# Patient Record
Sex: FEMALE | Race: BLACK OR AFRICAN AMERICAN | Employment: OTHER | URBAN - METROPOLITAN AREA
[De-identification: names, ages, dates, MRNs, and addresses within clinical notes are randomized per-mention and may not be internally consistent; named-entity substitution may affect disease eponyms.]

---

## 2022-03-18 PROBLEM — E55.9 VITAMIN D DEFICIENCY: Status: ACTIVE | Noted: 2021-09-30

## 2022-03-19 PROBLEM — U07.1 COVID-19: Status: ACTIVE | Noted: 2022-01-10

## 2022-07-26 ENCOUNTER — HOSPITAL ENCOUNTER (INPATIENT)
Age: 32
LOS: 1 days | Discharge: HOME | End: 2022-07-27
Attending: STUDENT IN AN ORGANIZED HEALTH CARE EDUCATION/TRAINING PROGRAM | Admitting: STUDENT IN AN ORGANIZED HEALTH CARE EDUCATION/TRAINING PROGRAM
Payer: COMMERCIAL

## 2022-07-26 LAB
ABO + RH BLD: NORMAL
BLOOD GROUP ANTIBODIES SERPL: NORMAL
ERYTHROCYTE [DISTWIDTH] IN BLOOD BY AUTOMATED COUNT: 15.4 % (ref 11.9–14.6)
HCT VFR BLD AUTO: 38.8 % (ref 35.8–46.3)
HGB BLD-MCNC: 12.5 G/DL (ref 11.7–15.4)
MCH RBC QN AUTO: 29.3 PG (ref 26.1–32.9)
MCHC RBC AUTO-ENTMCNC: 32.2 G/DL (ref 31.4–35)
MCV RBC AUTO: 90.9 FL (ref 79.6–97.8)
NRBC # BLD: 0 K/UL (ref 0–0.2)
PLATELET # BLD AUTO: 152 K/UL (ref 150–450)
PMV BLD AUTO: 11.4 FL (ref 9.4–12.3)
RBC # BLD AUTO: 4.27 M/UL (ref 4.05–5.2)
SPECIMEN EXP DATE BLD: NORMAL
WBC # BLD AUTO: 9.5 K/UL (ref 4.3–11.1)

## 2022-07-26 PROCEDURE — 7210000100 HC LABOR FEE PER 1 HR

## 2022-07-26 PROCEDURE — 4A1HXCZ MONITORING OF PRODUCTS OF CONCEPTION, CARDIAC RATE, EXTERNAL APPROACH: ICD-10-PCS | Performed by: STUDENT IN AN ORGANIZED HEALTH CARE EDUCATION/TRAINING PROGRAM

## 2022-07-26 PROCEDURE — 7100000011 HC PHASE II RECOVERY - ADDTL 15 MIN

## 2022-07-26 PROCEDURE — 6370000000 HC RX 637 (ALT 250 FOR IP): Performed by: STUDENT IN AN ORGANIZED HEALTH CARE EDUCATION/TRAINING PROGRAM

## 2022-07-26 PROCEDURE — 85027 COMPLETE CBC AUTOMATED: CPT

## 2022-07-26 PROCEDURE — 36415 COLL VENOUS BLD VENIPUNCTURE: CPT

## 2022-07-26 PROCEDURE — 7100000010 HC PHASE II RECOVERY - FIRST 15 MIN

## 2022-07-26 PROCEDURE — 86901 BLOOD TYPING SEROLOGIC RH(D): CPT

## 2022-07-26 PROCEDURE — 99282 EMERGENCY DEPT VISIT SF MDM: CPT

## 2022-07-26 PROCEDURE — 7220000101 HC DELIVERY VAGINAL/SINGLE

## 2022-07-26 PROCEDURE — 1100000000 HC RM PRIVATE

## 2022-07-26 RX ORDER — OXYCODONE HYDROCHLORIDE 5 MG/1
5 TABLET ORAL EVERY 4 HOURS PRN
Status: DISCONTINUED | OUTPATIENT
Start: 2022-07-26 | End: 2022-07-27 | Stop reason: HOSPADM

## 2022-07-26 RX ORDER — HYDROCORTISONE ACETATE PRAMOXINE HCL 2.5; 1 G/100G; G/100G
CREAM TOPICAL
Status: DISCONTINUED | OUTPATIENT
Start: 2022-07-26 | End: 2022-07-27 | Stop reason: HOSPADM

## 2022-07-26 RX ORDER — ONDANSETRON 8 MG/1
8 TABLET, ORALLY DISINTEGRATING ORAL EVERY 8 HOURS PRN
Status: DISCONTINUED | OUTPATIENT
Start: 2022-07-26 | End: 2022-07-27 | Stop reason: HOSPADM

## 2022-07-26 RX ORDER — SODIUM CHLORIDE 0.9 % (FLUSH) 0.9 %
5-40 SYRINGE (ML) INJECTION EVERY 12 HOURS SCHEDULED
Status: DISCONTINUED | OUTPATIENT
Start: 2022-07-26 | End: 2022-07-27 | Stop reason: HOSPADM

## 2022-07-26 RX ORDER — ACETAMINOPHEN 500 MG
1000 TABLET ORAL EVERY 8 HOURS
Status: DISCONTINUED | OUTPATIENT
Start: 2022-07-26 | End: 2022-07-27 | Stop reason: HOSPADM

## 2022-07-26 RX ORDER — SODIUM CHLORIDE, SODIUM LACTATE, POTASSIUM CHLORIDE, CALCIUM CHLORIDE 600; 310; 30; 20 MG/100ML; MG/100ML; MG/100ML; MG/100ML
INJECTION, SOLUTION INTRAVENOUS CONTINUOUS
Status: DISCONTINUED | OUTPATIENT
Start: 2022-07-26 | End: 2022-07-27 | Stop reason: HOSPADM

## 2022-07-26 RX ORDER — SODIUM CHLORIDE 9 MG/ML
INJECTION, SOLUTION INTRAVENOUS PRN
Status: DISCONTINUED | OUTPATIENT
Start: 2022-07-26 | End: 2022-07-27 | Stop reason: HOSPADM

## 2022-07-26 RX ORDER — IBUPROFEN 800 MG/1
800 TABLET ORAL EVERY 8 HOURS
Status: DISCONTINUED | OUTPATIENT
Start: 2022-07-26 | End: 2022-07-27 | Stop reason: HOSPADM

## 2022-07-26 RX ORDER — SODIUM CHLORIDE 0.9 % (FLUSH) 0.9 %
5-40 SYRINGE (ML) INJECTION PRN
Status: DISCONTINUED | OUTPATIENT
Start: 2022-07-26 | End: 2022-07-27 | Stop reason: HOSPADM

## 2022-07-26 RX ORDER — MAGNESIUM CARB/ALUMINUM HYDROX 105-160MG
120 TABLET,CHEWABLE ORAL DAILY PRN
Status: DISCONTINUED | OUTPATIENT
Start: 2022-07-26 | End: 2022-07-26

## 2022-07-26 RX ORDER — POLYETHYLENE GLYCOL 3350 17 G/17G
17 POWDER, FOR SOLUTION ORAL DAILY
Status: DISCONTINUED | OUTPATIENT
Start: 2022-07-26 | End: 2022-07-27 | Stop reason: HOSPADM

## 2022-07-26 RX ORDER — IBUPROFEN 800 MG/1
800 TABLET ORAL EVERY 8 HOURS
Status: DISCONTINUED | OUTPATIENT
Start: 2022-07-26 | End: 2022-07-26

## 2022-07-26 RX ORDER — LANOLIN
CREAM (ML) TOPICAL PRN
Status: DISCONTINUED | OUTPATIENT
Start: 2022-07-26 | End: 2022-07-27 | Stop reason: HOSPADM

## 2022-07-26 RX ORDER — ACETAMINOPHEN 500 MG
1000 TABLET ORAL EVERY 8 HOURS
Status: DISCONTINUED | OUTPATIENT
Start: 2022-07-26 | End: 2022-07-26

## 2022-07-26 RX ADMIN — ACETAMINOPHEN 1000 MG: 500 TABLET, FILM COATED ORAL at 23:03

## 2022-07-26 RX ADMIN — IBUPROFEN 800 MG: 800 TABLET, FILM COATED ORAL at 11:16

## 2022-07-26 RX ADMIN — OXYCODONE 5 MG: 5 TABLET ORAL at 08:12

## 2022-07-26 RX ADMIN — WITCH HAZEL 40 EACH: 500 SOLUTION RECTAL; TOPICAL at 23:03

## 2022-07-26 RX ADMIN — Medication 166.7 ML: at 07:40

## 2022-07-26 ASSESSMENT — PAIN DESCRIPTION - LOCATION
LOCATION: ABDOMEN;RECTUM
LOCATION: PERINEUM

## 2022-07-26 ASSESSMENT — PAIN DESCRIPTION - DESCRIPTORS: DESCRIPTORS: ACHING;CRAMPING;PRESSURE

## 2022-07-26 ASSESSMENT — PAIN SCALES - GENERAL
PAINLEVEL_OUTOF10: 10
PAINLEVEL_OUTOF10: 7

## 2022-07-26 NOTE — PROGRESS NOTES
Admit to [facility] for C/o UC starting at 5 AM.  Now UC are 3 min apart and pt describes feeling the urge to have a BM. -130 likely variable deceleration.   Pt is requesting and epidural.

## 2022-07-26 NOTE — LACTATION NOTE

## 2022-07-26 NOTE — PROGRESS NOTES
07/26/22 0717   Cervical Exam   Dilation (cm) 10   Effacement 100   Station 0   Station (Labor Curve Graph) 5   OB Examiner KIANA RN   Membrane/Amniotic Fluid   Sac Identifier Sac 1   Rupture Date 07/25/22   Rupture Time 0500   Amniotic Fluid Color Clear   Amniotic Fluid Odor None   Amniotic Fluid Amount Scant     Called to L and D for stat room and to call Dr Kaye Bains with complete pt in labor.

## 2022-07-26 NOTE — L&D DELIVERY NOTE
Mother's Information      Labor Events     Labor?: No  Cervical Ripening:   Now               Tonie Koyanagi, Baby Girl Sania Public [[id number]]      Labor Events     Labor?: No   Steroids?: None  Cervical Ripening Date/Time:     Antibiotics Received during Labor?: No  Rupture Date/Time: 22 05:00:00   Fluid Color: Clear  Fluid Odor: None  Induction: None  Augmentation: None  Labor Complications: None       Anesthesia    Method: None       Start Pushing      Labor onset date/time: 22 05:00:00 Now     Dilation complete date/time: 22 07:17:00 Now     Start pushing date/time: 2022 07:30:00   Decision date/time (emergent ):           Delivery ()      Delivery Date/Time:  22 07:35:00   Delivery Type: Vaginal, Spontaneous    Details:            Shoulder Dystocia    Shoulder Dystocia Present?: No  Add Second Maneuver  Add Third Maneuver  Add Fourth Maneuver  Add Fifth Maneuver  Add Sixth Maneuver  Add Seventh Maneuver  Add Eighth Maneuver  Add Ninth Maneuver       Assisted Delivery Details    Forceps Attempted?: No  Vacuum Extractor Attempted?: No       Document Additional Attempt         Document Additional Attempt                 Cord    Vessels: 3 Vessels  Complications: None  Delayed Cord Clamping?: Yes  Cord Clamped Date/Time: 2022 07:36:00  Cord Blood Disposition: Lab  Gases Sent?: No       Placenta    Date/Time: 2022 07:38:00       Lacerations           Vaginal Counts    Initial Count Personnel: Fermin Spoon  Initial Count Verified By: Shaista Sheffield RN    Sponges Needles Instruments   Initial Counts Correct Correct Correct   Final Counts Correct Correct Correct   Final Count Personnel: Fermin Spoon  Final Count Verified By: Shaista Sheffield RN  Accurate Final Count?: Yes  If the count is incorrect due to Intentionally Retained Foreign Object (IRFO) add the IRFO LDA in Lines/Drains.   Add LDA: Link to [facility]       Blood Loss  Mother: Sanjuana Feliciano" #[id number] Start of Mother's Information      Delivery Blood Loss  22 0500 - 22 0759      None                 End of Mother's Information  Mother: Kevin Dixon" #[id number]                Delivery Providers    Delivering clinician: Walker Mansfield MD     Provider Role    Walker Mansfield MD [address] [place] [address], RN Primary Nurse    Romulo Ca, RN Primary  Nurse    Marc Dhaliwal RN Charge Nurse    [address] Reza, RN Staff Nurse              [place] Assessment    Living Status: Living  Delivery Location Comment: 433     Apgar Scoring Key:    0 1 2    Skin Color: Blue or pale Acrocyanotic Completely pink    Heart Rate: Absent <100 bpm >100 bpm    Reflex Irritability: No response Grimace Cry or active withdrawal    Muscle Tone: Limp Some flexion Active motion    Respiratory Effort: Absent Weak cry; hypoventilation Good, crying                      Skin Color:   Heart Rate:   Reflex Irritability:   Muscle Tone:   Respiratory Effort:    Total:            1 Minute:        Apgar 1 total from OB History    5 Minute:        Apgar 5 total from OB History    10 Minute:              15 Minute:              20 Minute:                        Apgars Assigned By: Thang Lewis RN              Resuscitation    Method: Bulb Suction, Stimulation              Measurements      Birth Weight: 2400 g Birth Length: 0.48 m     Head Circumference: 0.32 m Chest Circumference: 0.3 m              Title      Skin to Skin Initiation Date/Time: 22 07:57:24 EDT     Skin to Skin With: Mother     Skin to Skin End Date/Time:

## 2022-07-26 NOTE — LACTATION NOTE
This note was copied from a baby's chart. Lactation visit. 2nd time mom, precip delivery this morning. Mom plans to bottle feed and pump only. Baby has taken 2 bottles so far, doing well with bottle feeds. Assisted Dad to bottle feed now. Upright hold and paced technique reviewed. Baby took bottle well, 20ml total. Assisted mom to pump. Reviewed fit of flange, pump parts, settings and hands on pumping technique. Mom pumped 5ml colostrum. Will save and feed at next feeding. Reviewed supply and demand. Pump every 3 hours or each time baby has a bottle. Ideal to pump 8 times in 24 hours. Mom agreeable. Reviewed safe breastmilk storage rules. Questions answered.

## 2022-07-26 NOTE — OP NOTE
Delivery Note    Obstetrician:  Joleen Canavan, MD    Assistant: none    Pre-Delivery Diagnosis: Term pregnancy, Spontaneous labor, Single fetus, and Pregnancy complicated by: velamentous cord insertion, anterior accessory lobe of placenta    Post-Delivery Diagnosis: Living  infant(s) and Female    Intrapartum Event: None    Procedure: Spontaneous vaginal delivery    Epidural: none    Monitor:  Fetal Heart Tones - External and Uterine Contractions - External    Indications for instrumental delivery: none    Estimated Blood Loss: see QBL    Episiotomy: n/a    Laceration(s):  none    Laceration(s) repair: n/a    Presentation: Cephalic    Fetal Description: rogers    Fetal Position: Left Occiput Anterior    Birth Weight: 5 lb 2 oz    Apgar - One Minute: 8    Apgar - Five Minutes: 9    Umbilical Cord: 3 vessels present and Cord blood sent to lab for type, Rh, and Susannah' test  Specimens: none  Complications:  none           Cord Blood Results:   Information for the patient's :  Vicki Spina Girl Kelly Face [[id number]]   No results found for: PCTABR, PCTDIG, BILI   Prenatal Labs:   No results found for: PCTABR     Attending Attestation: I was present and scrubbed for the entire procedure.     MD Geno Maldonado

## 2022-07-27 VITALS
HEART RATE: 74 BPM | OXYGEN SATURATION: 97 % | SYSTOLIC BLOOD PRESSURE: 123 MMHG | TEMPERATURE: 97.9 F | DIASTOLIC BLOOD PRESSURE: 86 MMHG | RESPIRATION RATE: 18 BRPM

## 2022-07-27 LAB — HGB BLD-MCNC: 10.8 G/DL (ref 11.7–15.4)

## 2022-07-27 PROCEDURE — 36415 COLL VENOUS BLD VENIPUNCTURE: CPT

## 2022-07-27 PROCEDURE — 6370000000 HC RX 637 (ALT 250 FOR IP): Performed by: STUDENT IN AN ORGANIZED HEALTH CARE EDUCATION/TRAINING PROGRAM

## 2022-07-27 PROCEDURE — 85018 HEMOGLOBIN: CPT

## 2022-07-27 RX ORDER — OXYCODONE HYDROCHLORIDE 5 MG/1
5 TABLET ORAL EVERY 4 HOURS PRN
Qty: 20 TABLET | Refills: 0 | Status: SHIPPED | OUTPATIENT
Start: 2022-07-27 | End: 2022-07-30

## 2022-07-27 RX ORDER — IBUPROFEN 800 MG/1
800 TABLET ORAL EVERY 8 HOURS
Qty: 120 TABLET | Refills: 3 | Status: SHIPPED | OUTPATIENT
Start: 2022-07-27

## 2022-07-27 RX ADMIN — IBUPROFEN 800 MG: 800 TABLET, FILM COATED ORAL at 08:02

## 2022-07-27 NOTE — CARE COORDINATION
Chart reviewed - first time parent. SW met with patient to complete initial assessment.  provided education on [facility] Postpartum [place] Home Visit Program.  Family was undecided on need for home visit. No referral will be made at this time. Family has this 's contact information should they decide to participate in program.    Patient given informational packet on  mood & anxiety disorders (resources/education). Family denies any additional needs from  at this time. Family has 's contact information should any needs/questions arise.     DIONE Palma-ANEL, [address] [facility]   [phone]

## 2022-07-27 NOTE — LACTATION NOTE
This note was copied from a baby's chart. Individualized Feeding Plan for Breastfeeding   Lactation Services [phone]    As much as possible, hold your baby on your chest so babys bare skin is against your bare skin with a blanket covering babys back, especially 30 minutes before it is time for baby to eat. Watch for early feeding cues such as, licking lips, sucking motions, rooting, hands to mouth. Crying is a late feeding cue. Feed your baby at least 8 times in 24 hours, or more if your baby is showing feeding cues. If baby is sleepy put baby skin to skin and watch for hunger cues. To rouse baby: unwrap, undress, massage hands, feet, & back, change diaper, gently change babys position from lying to sitting. 15-20 minutes on the first breast of active breastfeeding is considered a good feeding. Good, active breastfeeding is when baby is alert, tugging the nipple, their ear may move, and you can hear swallows. Allow baby to finish the first side before changing sides. Sleeping at the breast or only brief, light sucks should not be considered a good, full breastfeed. At each feeding:  __x__1. Do Suck Practice on finger before each feeding until sucking pattern is smooth. Try using index finger. Nail down towards tongue. __x__2. Hand Express for a few minutes prior to latching to help start milk flow. __x__3. Baby needs to NURSE WELL x 15-20 minutes on at least first breast, burp and offer 2nd breast at every feeding. If no sustained latch only attempt at breast for 10 minutes. If baby does not latch on and feed well on at least one side, you should:   __x__4. Double pump for 15 minutes with breast massage and compression. Hand express for an additional 2-3 minutes per side. Pump after each feeding attempt or poor feeding, up to 8 times per day. If you are not putting baby to the breast you need to pump 8 times a day. Pump every 3 hours. __x__5.  Give baby all of the breast milk you obtain using a straight syringe or  curved syringe. If baby does NOT have enough wet and dirty diapers per day, is jaundiced/lethargic, or has significant weight loss AND you do NOT pump enough milk for each feeding (per volume listed below), formula supplementation may need to be used. Call lactation department /pediatrician if you have concerns. AVERAGE INTAKES OF COLOSTRUM BY HEALTHY  INFANTS:  Time  Day Intake (ml per feeding)  Based on 8 feedings per day. 1st 24 hrs  1 2-10 ml  24-48 hrs  2 5-15 ml  48-72 hrs  3 15-30 ml (0.5-1 oz)  72-96 hrs  4 30-45 ml (1-1.5oz)                          5-6      45-60 ml (1.5-2oz)                           7          60 ml + more as desired    By day 7, baby will need 60 ml or 1.8 oz at each feeding based on 8 feedings per day & babys weight. (1oz = 30ml). Total milk volume needed in 24 hours by Day 7 is 15 oz per day based on baby's birthweight of 5lb 5oz. The more often baby eats, the less volume they need per feeding. If baby is eating more often than the minimum of 8 times per day, they may take less per feeding. Comments: If pumping, suggest using olive oil or coconut oil on your nipples before pumping to help reduce the friction. Use feeding plan until follow up with pediatrician. Continue to attempt at the breast for most feeds. Pump every 3 hours if no latch. Give all pumped colostrum/breastmilk at each feeding. OUTPATIENT APPOINTMENT Suggested. Outpatient services are located on the 4th floor at [facility]. Check in at the 4th floor registration desk (the same one you used when you came to have your baby).   Call for questions [phone]

## 2022-07-27 NOTE — DISCHARGE INSTRUCTIONS
Pelvic rest for 6wk  NO driving for 2wk or while taking pain medications  NO tub baths, pools, or hot tubs for 6wk. NO push/pull motion such as vacuuming or sweeping for 2wk  NO lifting >10lb for 2wk. Vaginal Childbirth: Care Instructions  Overview     Vaginal birth means delivering a baby through the birth canal (vagina). During labor, the uterus tightens (contracts) regularly to thin and open the cervixand to push the baby out through the birth canal.  Your body will slowly heal in the next few weeks. It's easy to get too tired and overwhelmed during the first weeks after your baby is born. Changes in your hormones can shift your mood without warning. You may find it hard to meet theextra demands on your energy and time. Take it easy on yourself. Follow-up care is a key part of your treatment and safety. Be sure to make and go to all appointments, and call your doctor if you are having problems. It's also a good idea to know your test results and keep alist of the medicines you take. How can you care for yourself at home? Vaginal bleeding and cramps  After delivery, you will have a bloody discharge from your vagina. This will turn pink within a week and then white or yellow after about 10 days. It may last for 2 to 4 weeks or longer, until the uterus has healed. Use sanitary pads until you stop bleeding. Using pads makes it easier to monitor your bleeding. Don't worry if you pass some blood clots, as long as they are smaller than a golf ball. If you have a tear or stitches in your vaginal area, change the pad at least every 4 hours. This will help prevent soreness and infection. You may have cramps for the first few days after childbirth. These are normal and occur as the uterus shrinks to normal size. Take an over-the-counter pain medicine, such as acetaminophen (Tylenol), ibuprofen (Advil, Motrin), or naproxen (Aleve), for cramps. Read and follow all instructions on the label.  Do not take aspirin, because it can cause more bleeding. Do not take two or more pain medicines at the same time unless the doctor told you to. Many pain medicines have acetaminophen, which is Tylenol. Too much acetaminophen (Tylenol) can be harmful. Stitches  If you have stitches, they will dissolve on their own and don't need to be removed. Follow your doctor's instructions for cleaning the stitched area. Put ice or a cold pack on your painful area for 10 to 20 minutes at a time, several times a day, for the first few days. Put a thin cloth between the ice and your skin. Sit in a few inches of warm water (sitz bath) 3 times a day and after bowel movements. The warm water helps with pain and itching. If you don't have a tub, a warm shower might help. Breast fullness  Your breasts may overfill (engorge) in the first few days after nursing, don't put warmth on your breasts or touch your breasts. Wear a bra that fits well and use ice until the fullness goes away. This usually takes 2 to 3 days. Put ice or a cold pack on your breast after nursing to reduce swelling and pain. Put a thin cloth between the ice and your skin. Activity  Eat a balanced diet. Don't try to lose weight by cutting calories. Keep taking your prenatal vitamins, or take a multivitamin. Get as much rest as you can. Try to take naps when your baby sleeps during the day. Get some exercise every day. But don't do any heavy exercise until your doctor says it is okay. Wait until you are healed (about 4 to 6 weeks) before you have sexual intercourse. Your doctor will tell you when it is okay to have sex. If you don't want to get pregnant, talk to your doctor about birth control. You can get pregnant even before your period returns. Also, you can get pregnant while you are breastfeeding. Mental health  It's normal to have some sadness, anxiety, sleeplessness, and mood swings after you go home.  If you feel upset or hopeless for more than a few days or are having trouble doing the things you need to do, talk to your doctor. Constipation and hemorrhoids  Drink plenty of fluids. If you have kidney, heart, or liver disease and have to limit fluids, talk with your doctor before you increase the amount of fluids you drink. Eat plenty of fiber each day. Have a bran muffin or bran cereal for breakfast. Try eating a piece of fruit for a mid-afternoon snack. For painful, itchy hemorrhoids, put ice or a cold pack on the area several times a day for 10 minutes at a time. Follow this by putting a warm compress on the area for another 10 to 20 minutes or by sitting in a shallow, warm bath. When should you call for help? Share this information with your partner, family, or a friend. They can help you watch for warning signs. Call 911 anytime you think you may need emergency care. For example, call if:    You have thoughts of harming yourself, your baby, or another person. You passed out (lost consciousness). You have chest pain, are short of breath, or cough up blood. You have a seizure. Call your doctor now or seek immediate medical care if:    You have signs of hemorrhage (too much bleeding), such as:  Heavy vaginal bleeding. This means that you are soaking through one or more pads in an hour. Or you pass blood clots bigger than an egg. Feeling dizzy or lightheaded, or you feel like you may faint. Feeling so tired or weak that you cannot do your usual activities. A fast or irregular heartbeat. New or worse belly pain. You have signs of infection, such as:  A fever. Vaginal discharge that smells bad. New or worse belly pain. You have symptoms of a blood clot in your leg (called a deep vein thrombosis), such as:  Pain in the calf, back of the knee, thigh, or groin. Redness and swelling in your leg or groin. You have signs of preeclampsia, such as:  Sudden swelling of your face, hands, or feet.   New vision problems (such as dimness, blurring, or seeing spots). A severe headache. Watch closely for changes in your health, and be sure to contact your doctor if:    Your vaginal bleeding isn't decreasing. You feel sad, anxious, or hopeless for more than a few days. You are having problems with your breasts or breastfeeding. Where can you learn more? Go to https://chpeanabeleweb.healthAuditFile. org and sign in to your [employer] account. Enter V436 in the Xueersi box to learn more about \"Vaginal Childbirth: Care Instructions. \"     If you do not have an account, please click on the \"Sign Up Now\" link. Current as of: February 23, 2022               Content Version: 13.3  © 2006-2022 [employer]. Care instructions adapted under license by [facility] ([facility]). If you have questions about a medical condition or this instruction, always ask your healthcare professional. [address] any warranty or liability for your use of this information.

## 2022-07-27 NOTE — LACTATION NOTE
This note was copied from a baby's chart. In to see mom and infant for discharge. Mom plans to pump and formula feed. Gave feeding plan for guidance on amounts to aim for in first week of life w/ milk volume. Reviewed how to manage period of engorgement. Mom has personal pump at home. No further needs at this time.

## 2022-07-27 NOTE — PROGRESS NOTES
Shift assessment complete as noted. Patient request pain medicine. Questions encouraged and answered. Encouraged to call for needs or concerns. Verbalizes understanding. Tucks given with instructions. Plan of care reviewed and white board updated.

## 2022-10-05 ENCOUNTER — TELEPHONE (OUTPATIENT)
Dept: PRIMARY CARE CLINIC | Facility: CLINIC | Age: 32
End: 2022-10-05

## 2022-10-05 DIAGNOSIS — E55.9 VITAMIN D DEFICIENCY, UNSPECIFIED: ICD-10-CM

## 2022-10-06 RX ORDER — ERGOCALCIFEROL 1.25 MG/1
CAPSULE ORAL
Qty: 4 CAPSULE | Refills: 5 | OUTPATIENT
Start: 2022-10-06

## 2022-10-12 NOTE — TELEPHONE ENCOUNTER
Called and left patient a message to call the office and schedule an appointment for medication refills.

## 2022-11-18 ENCOUNTER — TELEPHONE (OUTPATIENT)
Dept: PRIMARY CARE CLINIC | Facility: CLINIC | Age: 32
End: 2022-11-18

## 2022-11-18 NOTE — TELEPHONE ENCOUNTER
Called and left a message for patient to call the office and schedule an appointment .      Appointment Request From: Hair Catalan     With Provider: Abdullahi Santana MD [[facility]]     Preferred Date Range: 11/21/2022 - 11/25/2022     Preferred Times: Any Time     Reason for visit: Office Visit     Comments:  Vaccine titers-MMR Hep B   Vitamin D refill

## 2023-02-27 ENCOUNTER — TELEMEDICINE (OUTPATIENT)
Dept: PRIMARY CARE CLINIC | Facility: CLINIC | Age: 33
End: 2023-02-27
Payer: MEDICAID

## 2023-02-27 DIAGNOSIS — Z87.898 HISTORY OF MOTION SICKNESS: ICD-10-CM

## 2023-02-27 DIAGNOSIS — E55.9 VITAMIN D DEFICIENCY: Primary | ICD-10-CM

## 2023-02-27 DIAGNOSIS — Z79.899 ENCOUNTER FOR LONG-TERM (CURRENT) USE OF MEDICATIONS: ICD-10-CM

## 2023-02-27 PROCEDURE — 99213 OFFICE O/P EST LOW 20 MIN: CPT | Performed by: FAMILY MEDICINE

## 2023-02-27 RX ORDER — SCOLOPAMINE TRANSDERMAL SYSTEM 1 MG/1
1 PATCH, EXTENDED RELEASE TRANSDERMAL
Qty: 4 PATCH | Refills: 1 | Status: SHIPPED | OUTPATIENT
Start: 2023-02-27

## 2023-02-27 RX ORDER — ERGOCALCIFEROL 1.25 MG/1
50000 CAPSULE ORAL
Qty: 12 CAPSULE | Refills: 3 | Status: SHIPPED | OUTPATIENT
Start: 2023-02-27

## 2023-02-27 SDOH — ECONOMIC STABILITY: HOUSING INSECURITY
IN THE LAST 12 MONTHS, WAS THERE A TIME WHEN YOU DID NOT HAVE A STEADY PLACE TO SLEEP OR SLEPT IN A SHELTER (INCLUDING NOW)?: NO

## 2023-02-27 SDOH — ECONOMIC STABILITY: FOOD INSECURITY: WITHIN THE PAST 12 MONTHS, YOU WORRIED THAT YOUR FOOD WOULD RUN OUT BEFORE YOU GOT MONEY TO BUY MORE.: NEVER TRUE

## 2023-02-27 SDOH — ECONOMIC STABILITY: TRANSPORTATION INSECURITY
IN THE PAST 12 MONTHS, HAS LACK OF TRANSPORTATION KEPT YOU FROM MEETINGS, WORK, OR FROM GETTING THINGS NEEDED FOR DAILY LIVING?: NO

## 2023-02-27 SDOH — ECONOMIC STABILITY: INCOME INSECURITY: HOW HARD IS IT FOR YOU TO PAY FOR THE VERY BASICS LIKE FOOD, HOUSING, MEDICAL CARE, AND HEATING?: NOT HARD AT ALL

## 2023-02-27 SDOH — ECONOMIC STABILITY: FOOD INSECURITY: WITHIN THE PAST 12 MONTHS, THE FOOD YOU BOUGHT JUST DIDN'T LAST AND YOU DIDN'T HAVE MONEY TO GET MORE.: NEVER TRUE

## 2023-02-27 ASSESSMENT — PATIENT HEALTH QUESTIONNAIRE - PHQ9
SUM OF ALL RESPONSES TO PHQ QUESTIONS 1-9: 0
1. LITTLE INTEREST OR PLEASURE IN DOING THINGS: 0
2. FEELING DOWN, DEPRESSED OR HOPELESS: 0
SUM OF ALL RESPONSES TO PHQ QUESTIONS 1-9: 0
SUM OF ALL RESPONSES TO PHQ9 QUESTIONS 1 & 2: 0
SUM OF ALL RESPONSES TO PHQ QUESTIONS 1-9: 0
SUM OF ALL RESPONSES TO PHQ QUESTIONS 1-9: 0

## 2023-02-27 NOTE — PROGRESS NOTES
Tylor Bernabe M.D.  [address] [facility]  Nicole Lopez  Phone:  [phone]  Fax:  [phone]          I was in the office while conducting this encounter. The patient was at home. CHIEF COMPLAINT:  Chief Complaint   Patient presents with    Medication Refill     She continues to take weekly vitamin D as prescribed. She is doing well with medication. She is asking for refill. Dizziness     Patient states that she is going on a cruise in a few weeks. She is asking for something to help with motion sickness. HISTORY OF PRESENT ILLNESS:  Ms. Matt Khanna is a 27 y.o. female  who presents for follow up. She continues to take weekly vitamin D as prescribed. She is doing well with medication. She is asking for refill. Patient states that she is going on a cruise in a few weeks. She is asking for something to help with motion sickness. No other complaints. Taking medications as prescribed. Medications reviewed and updated. HISTORY:  No Known Allergies      REVIEW OF SYSTEMS:  Review of systems is as indicated in HPI otherwise negative. PHYSICAL EXAM:    Vital Signs: (As obtained by patient/caregiver at home)  Patient-Reported Vitals 2/27/2023   Patient-Reported Weight 233   Patient-Reported Height 59   Patient-Reported Systolic 532   Patient-Reported Diastolic 83   Patient-Reported Pulse 62   Patient-Reported Temperature 98.6   Patient-Reported SpO2 100           PHQ:  PHQ-9  2/27/2023   Little interest or pleasure in doing things 0   Little interest or pleasure in doing things -   Feeling down, depressed, or hopeless 0   PHQ-2 Score 0   Total Score PHQ 2 -   PHQ-9 Total Score 0       LABS  No results found for this visit on 02/27/23.   Admission on 07/26/2022, Discharged on 07/27/2022   Component Date Value Ref Range Status    Crossmatch expiration date 07/26/2022 07/29/2022,2359   Final    ABO/Rh 07/26/2022 A POSITIVE   Final    Antibody Screen 07/26/2022 NEG   Final    WBC 07/26/2022 9.5  4.3 - 11.1 K/uL Final    RBC 07/26/2022 4.27  4.05 - 5.2 M/uL Final    Hemoglobin 07/26/2022 12.5  11.7 - 15.4 g/dL Final    Hematocrit 07/26/2022 38.8  35.8 - 46.3 % Final    MCV 07/26/2022 90.9  79.6 - 97.8 FL Final    MCH 07/26/2022 29.3  26.1 - 32.9 PG Final    MCHC 07/26/2022 32.2  31.4 - 35.0 g/dL Final    RDW 07/26/2022 15.4 (A)  11.9 - 14.6 % Final    Platelets 39/98/6974 152  150 - 450 K/uL Final    MPV 07/26/2022 11.4  9.4 - 12.3 FL Final    nRBC 07/26/2022 0.00  0.0 - 0.2 K/uL Final    **Note: Absolute NRBC parameter is now reported with Hemogram**    Hemoglobin 07/27/2022 10.8 (A)  11.7 - 15.4 g/dL Final       IMPRESSION/PLAN     Diagnosis Orders   1. Vitamin D deficiency  ergocalciferol (ERGOCALCIFEROL) 1.25 MG (33666 UT) capsule      2. History of motion sickness  scopolamine (TRANSDERM-SCOP) transdermal patch      3. Encounter for long-term (current) use of medications            Follow up and Dispositions:  Return in about 6 months (around 8/27/2023). Patient is stable on medications and will continue current medications. Refilled the above medications. Will add the following medications: Scopolamine patches to apply every 72 hours as needed for dizziness while on the cruise. Reviewed most recent labs. Reviewed diet, exercise and weight control. Consent: This patient and/or their healthcare decision maker is aware that this patient-initiated Telehealth encounter is a billable service, with coverage as determined by their insurance carrier. Patient is aware that they may receive a bill and has provided verbal consent to proceed: Yes     The patient is being evaluated by a Virtual Visit (video visit) encounter to address concerns as mentioned above. A caregiver was present when appropriate.  Due to this being a TeleHealth encounter (During WSOKR-91 public health emergency), evaluation of the following organ systems was limited: Vitals/Constitutional/EENT/Resp/CV/GI//MS/Neuro/Skin/Heme-Lymph-Imm. Pursuant to the emergency declaration under the [address] [place] [address] and the [employer] Act, this Virtual Visit was conducted with patient's (and/or legal guardian's) consent, to reduce the patient's risk of exposure to COVID-19 and provide necessary medical care. The patient (and/or legal guardian) has also been advised to contact this office for worsening conditions or problems, and seek emergency medical treatment and/or call 911 if deemed necessary. Patient identification was verified at the start of the visit: YES    Services were provided through a video synchronous discussion virtually to substitute for in-person clinic visit. Patient and provider were located at their individual homes. Rere Cramer, was evaluated through a synchronous (real-time) audio-video encounter. The patient (or guardian if applicable) is aware that this is a billable service, which includes applicable co-pays. This Virtual Visit was conducted with patient's (and/or legal guardian's) consent. The visit was conducted pursuant to the emergency declaration under the [address] [place] [address] and the [facility] Act. Patient identification was verified, and a caregiver was present when appropriate. The patient was located at Home: [address] [place] [id number]  Provider was located at [facility]): [facility] [address],  hospitals 14.        Total Time: minutes: 11-20 minutes. Dictated using voice recognition software.  Proofread, but unrecognized voice recognition errors may exist.    Criselda Johnson MD  02/27/23

## 2023-02-28 ENCOUNTER — TELEPHONE (OUTPATIENT)
Dept: PRIMARY CARE CLINIC | Facility: CLINIC | Age: 33
End: 2023-02-28

## 2023-02-28 NOTE — TELEPHONE ENCOUNTER
Called Greer to check her out of her appt and schedule her  next one.  She did not answer so I left a message for her to call back

## 2023-11-28 ENCOUNTER — E-VISIT (OUTPATIENT)
Dept: PRIMARY CARE CLINIC | Facility: CLINIC | Age: 33
End: 2023-11-28
Payer: MEDICAID

## 2023-11-28 DIAGNOSIS — B35.1 ONYCHOMYCOSIS: Primary | ICD-10-CM

## 2023-11-28 PROCEDURE — 99421 OL DIG E/M SVC 5-10 MIN: CPT | Performed by: FAMILY MEDICINE

## 2023-11-28 NOTE — PROGRESS NOTES
Amado Isbell (1990) initiated an asynchronous digital communication through [employer]. HPI: per patient questionnaire  Exam: not applicable  Diagnoses and all orders for this visit:  Diagnoses and all orders for this visit:    Onychomycosis  -     ciclopirox (PENLAC) 8 % solution; Apply topically nightly until clear          Time: EV2 - 11-20 minutes were spent on the digital evaluation and management of this patient.        Marco Aquino MD

## 2024-06-02 NOTE — H&P
Pt answers all orientation questions correctly but is forgetful, has short attention span, and intermittent confusion, restless. Pt used commode this shift. Loose stools. Needs help ordering and setting up help with food. Bp elevated 171/81.   Daughter Veronica updated in room.      Deisy Shirley RN on 6/2/2024 at 6:36 PM    History & Physical    Name: Nir Lund MRN: [id number]  SSN: [id number]    YOB: 1990  Age: 33 y.o. Sex: female      Subjective:     Estimated Date of Delivery: 22  OB History    Para Term  AB Living   1             SAB IAB Ectopic Molar Multiple Live Births                    # Outcome Date GA Lbr James/2nd Weight Sex Delivery Anes PTL Lv   1 Current                Ms. Tex Urbina is admitted with pregnancy at 37w2d for precipitous vaginal delivery. Prenatal course was complicated by  velamentous cord insertion, anterior accessory lobe of placenta . Please see prenatal records for details. Pt presented to L&D complete and delivered precipitously. Per FOB, pt had SROM at 0500. Past Medical History:   Diagnosis Date    COVID-19 1/10/2022    2022     No past surgical history on file. Social History     Occupational History    Not on file   Tobacco Use    Smoking status: Never    Smokeless tobacco: Never   Substance and Sexual Activity    Alcohol use: Yes    Drug use: Not Currently    Sexual activity: Not on file     Family History   Problem Relation Age of Onset    No Known Problems Father        Not on File  Prior to Admission medications    Medication Sig Start Date End Date Taking? Authorizing Provider   ergocalciferol (ERGOCALCIFEROL) 1.25 MG (52784 UT) capsule Take 50,000 Units by mouth every 7 days 10/3/21   Ar Automatic Reconciliation        Review of Systems:   Pertinent ROS noted in the HPI. Objective:     Vitals:  Vitals:    22 0745 22 0747   BP: 133/87    Pulse: 68    Resp: 18    Temp: 98.2 °F (36.8 °C)    TempSrc: Oral    SpO2: 100% 100%        Physical Exam:  Constitutional: no distress  Heart: RRR  Lungs: CTAB  Abdomen: soft, nontender  LE: tr swelling  SVE: complete per RN  Membranes:  Spontaneous Rupture of Membranes;  Amniotic Fluid: clear fluid  Fetal Heart Rate: Cat 1     Prenatal Labs:   No results found for: [address] MAGDIEL Win, HBSAGEXT, HIVEXT, RPREXT, GONNOEXT, CHLAMEXT    Impression/Plan:     Active Problems:    * No active hospital problems. *  Resolved Problems:    * No resolved hospital problems. *       Plan: Admit for induction of labor. Group B Strep negative. Delivered precipitously shortly after admission.       MD Geno Coates

## 2024-10-28 ENCOUNTER — OFFICE VISIT (OUTPATIENT)
Dept: OBGYN CLINIC | Age: 34
End: 2024-10-28
Payer: COMMERCIAL

## 2024-10-28 VITALS
WEIGHT: 221.7 LBS | SYSTOLIC BLOOD PRESSURE: 108 MMHG | DIASTOLIC BLOOD PRESSURE: 60 MMHG | BODY MASS INDEX: 32.84 KG/M2 | HEIGHT: 69 IN

## 2024-10-28 DIAGNOSIS — Z12.4 PAP SMEAR FOR CERVICAL CANCER SCREENING: ICD-10-CM

## 2024-10-28 DIAGNOSIS — Z01.419 WELL WOMAN EXAM: Primary | ICD-10-CM

## 2024-10-28 DIAGNOSIS — Z11.51 SCREENING FOR HPV (HUMAN PAPILLOMAVIRUS): ICD-10-CM

## 2024-10-28 PROCEDURE — 99395 PREV VISIT EST AGE 18-39: CPT | Performed by: STUDENT IN AN ORGANIZED HEALTH CARE EDUCATION/TRAINING PROGRAM

## 2024-10-28 NOTE — PROGRESS NOTES
HPI:  Ms. Brennan is a 33 y.o.  who is here today for a well woman exam. She complains of nothing.      Date Performed Result   PAP  Normal per pt    Mammogram na na   Colonoscopy na na   Dexa na na       GYN History     LMP: 10/16/24  Cycle Length 29   Lasting 2  negative dysmenorrhea  negative postcoital bleeding      Past Medical History:  Past Medical History:   Diagnosis Date    COVID-19 1/10/2022    2022       Past Surgical History:  No past surgical history on file.    Allergies:   No Known Allergies    Medication History:  No current outpatient medications on file.     No current facility-administered medications for this visit.       Social History:  Social History     Socioeconomic History    Marital status:      Spouse name: Not on file    Number of children: Not on file    Years of education: Not on file    Highest education level: Not on file   Occupational History    Not on file   Tobacco Use    Smoking status: Never    Smokeless tobacco: Never   Vaping Use    Vaping status: Never Used   Substance and Sexual Activity    Alcohol use: Yes     Alcohol/week: 1.0 standard drink of alcohol     Types: 1 Glasses of wine per week    Drug use: Never    Sexual activity: Yes     Partners: Male     Birth control/protection: None   Other Topics Concern    Not on file   Social History Narrative    Not on file     Social Determinants of Health     Financial Resource Strain: Low Risk  (2024)    Received from [facility]    Financial Resource Strain     Difficulty Paying Living Expenses: Not hard at all     Difficulty Paying Medical Expenses: No   Food Insecurity: No Food Insecurity (2024)    Received from [facility]    Food Insecurity     Worried about Running Out of Food in the Last Year: Never true     Ran Out of Food in the Last Year: Never true   Transportation Needs: No Transportation Needs (2024)    Received from [facility]    Transportation Needs     Lack of

## 2024-11-03 LAB
COLLECTION METHOD: NORMAL
CYTOLOGIST CVX/VAG CYTO: NORMAL
CYTOLOGY CVX/VAG DOC THIN PREP: NORMAL
DATE OF LMP: NORMAL
HPV APTIMA: NEGATIVE
Lab: NORMAL
PAP SOURCE: NORMAL
PATH REPORT.FINAL DX SPEC: NORMAL
STAT OF ADQ CVX/VAG CYTO-IMP: NORMAL